# Patient Record
Sex: MALE | Race: OTHER | Employment: UNEMPLOYED | URBAN - METROPOLITAN AREA
[De-identification: names, ages, dates, MRNs, and addresses within clinical notes are randomized per-mention and may not be internally consistent; named-entity substitution may affect disease eponyms.]

---

## 2021-09-26 ENCOUNTER — HOSPITAL ENCOUNTER (EMERGENCY)
Age: 36
Discharge: HOME | End: 2021-09-26

## 2021-09-26 VITALS
DIASTOLIC BLOOD PRESSURE: 96 MMHG | WEIGHT: 160 LBS | HEART RATE: 69 BPM | RESPIRATION RATE: 18 BRPM | TEMPERATURE: 98.5 F | BODY MASS INDEX: 30.21 KG/M2 | HEIGHT: 61 IN | SYSTOLIC BLOOD PRESSURE: 135 MMHG | OXYGEN SATURATION: 98 %

## 2021-09-26 DIAGNOSIS — H92.03 OTALGIA OF BOTH EARS: Primary | ICD-10-CM

## 2021-09-26 DIAGNOSIS — H61.23 BILATERAL IMPACTED CERUMEN: ICD-10-CM

## 2021-09-26 PROCEDURE — 6370000000 HC RX 637 (ALT 250 FOR IP): Performed by: PHYSICIAN ASSISTANT

## 2021-09-26 PROCEDURE — 99283 EMERGENCY DEPT VISIT LOW MDM: CPT

## 2021-09-26 RX ORDER — AMOXICILLIN 250 MG/1
500 CAPSULE ORAL 3 TIMES DAILY
Qty: 42 CAPSULE | Refills: 0 | Status: SHIPPED | OUTPATIENT
Start: 2021-09-26 | End: 2021-09-29 | Stop reason: SDUPTHER

## 2021-09-26 RX ORDER — IBUPROFEN 600 MG/1
600 TABLET ORAL EVERY 6 HOURS PRN
Qty: 20 TABLET | Refills: 0 | Status: SHIPPED | OUTPATIENT
Start: 2021-09-26 | End: 2021-09-29 | Stop reason: SDUPTHER

## 2021-09-26 RX ADMIN — Medication 5 DROP: at 23:30

## 2021-09-26 ASSESSMENT — PAIN SCALES - GENERAL: PAINLEVEL_OUTOF10: 5

## 2021-09-26 ASSESSMENT — PAIN DESCRIPTION - PAIN TYPE: TYPE: ACUTE PAIN

## 2021-09-26 NOTE — ED TRIAGE NOTES
Pt to ED c/o R ear pain x 8 days. Pt states \"I got water in both ears about 8 days ago and now the R ear has a lot of pressure in it. \"

## 2021-09-27 NOTE — ED NOTES
Patient reports to ED with complaints of ear pain after getting water in ears after taking a bath and going under water eight days ago. Patient declines pain with pulling on ears. Patient declines fever, N/V, diarrhea. Upon provider assessment heavy wax noted in both ears, unable to see eardrum due to the amount of wax build up. Patient agreeable to plan of care.          Victorino Ram RN  09/26/21 7371

## 2021-09-27 NOTE — ED PROVIDER NOTES
Triage Chief Complaint:   Otalgia (R x 8 days)    [place]:  Today in the ED I had the pleasure of caring for Lazarus Snuffer who is a 43 y.o. male that presents today to the emergency department complaining of ear pain bilateral.  Ongoing x8 days. Context is patient states he took a bath but he said underwater. Since then he has had pain pressure. Feels like there is still water in his ears. He denies any throat pain head pain headache. No fever chills nausea vomit diarrhea. No trauma to the ears. ROS:  REVIEW OF SYSTEMS    At least 10 systems reviewed      All other review of systems are negative  See HPI and nursing notes for additional information       No past medical history on file. No past surgical history on file. No family history on file. Social History     Socioeconomic History    Marital status: Single     Spouse name: Not on file    Number of children: Not on file    Years of education: Not on file    Highest education level: Not on file   Occupational History    Not on file   Tobacco Use    Smoking status: Not on file   Substance and Sexual Activity    Alcohol use: Not on file    Drug use: Not on file    Sexual activity: Not on file   Other Topics Concern    Not on file   Social History Narrative    Not on file     Social Determinants of Health     Financial Resource Strain:     Difficulty of Paying Living Expenses:    Food Insecurity:     Worried About Running Out of Food in the Last Year:     [address] in the Last Year:    Transportation Needs:     Lack of Transportation (Medical):      Lack of Transportation (Non-Medical):    Physical Activity:     Days of Exercise per Week:     Minutes of Exercise per Session:    Stress:     Feeling of Stress :    Social Connections:     Frequency of Communication with Friends and Family:     Frequency of Social Gatherings with Friends and Family:     Attends [employer] Services:     Active Member of Clubs or Organizations:     Attends Club or Organization Meetings:     Marital Status:    Intimate Partner Violence:     Fear of Current or Ex-Partner:     Emotionally Abused:     Physically Abused:     Sexually Abused:      Current Facility-Administered Medications   Medication Dose Route Frequency Provider Last Rate Last Admin    carbamide peroxide (DEBROX) 6.5 % otic solution 5 drop  5 drop Both Ears Once Katherina Kocher, PA-C         Current Outpatient Medications   Medication Sig Dispense Refill    amoxicillin (AMOXIL) 250 MG capsule Take 2 capsules by mouth 3 times daily for 7 days 42 capsule 0    ibuprofen (IBU) 600 MG tablet Take 1 tablet by mouth every 6 hours as needed for Pain 20 tablet 0     No Known Allergies    Nursing Notes Reviewed    Physical Exam:  ED Triage Vitals   Enc Vitals Group      BP 09/26/21 1942 (!) 135/96      Pulse 09/26/21 1942 69      Resp 09/26/21 1942 18      Temp 09/26/21 1944 98.5 °F (36.9 °C)      Temp Source 09/26/21 1944 Oral      SpO2 09/26/21 1942 98 %      Weight 09/26/21 1942 160 lb (72.6 kg)      Height 09/26/21 1942 5' 1.42\" (1.56 m)      Head Circumference --       Peak Flow --       Pain Score --       Pain Loc --       Pain Edu? --       Excl. in [address]? --      General :Patient is awake alert oriented person place and time no acute distress nontoxic appearing  HEENT: Pupils are equally round and reactive to light extraocular motors are intact conjunctivae clear sclerae white there is no injection no icterus. Nose without any rhinorrhea or epistaxis. Oral mucosa is moist no exudate buccal mucosa shows no ulcerations. Uvula is midline. No tragal tenderness bilateral.  External auditory canals clear from discharge bilateral.  No edema. Cerumen impaction is noted.   Bilateral.  Neck: Neck is supple full range of motion trachea midline thyroid nonpalpable  Cardiac: Heart regular rate rhythm no murmurs rubs clicks or gallops  Lungs: Lungs are clear to auscultation there is no wheezing rhonchi or rales. There is no use of accessory muscles no nasal flaring identified. Chest wall: There is no tenderness to palpation over the chest wall or over ribs  Abdomen: Abdomen is soft nontender nondistended. There is no firm or pulsatile masses no rebound rigidity or guarding negative Pelayo's negative McBurney, no peritoneal signs  Suprapubic:  there is no tenderness to palpation over the external bladder   Musculoskeletal: 5 out of 5 strength in all 4 extremities full flexion extension abduction and adduction supination pronation of all extremities and all digits. No obvious muscle atrophy is noted. No focal muscle deficits are appreciated  Dermatology: Skin is warm and dry there is no obvious abscesses lacerations or lesions noted  Psych: Mentation is grossly normal cognition is grossly normal. Affect is appropriate  Neuro: Motor intact sensory intact cranial nerves II through XII are intact level of consciousness is normal cerebellar function is normal reflexes are grossly normal. No evidence of incontinence or loss of bowel or bladder no saddle anesthesia noted Lymphatic: There is no submandibular or cervical adenopathy appreciated. I have reviewed and interpreted all of the currently available lab results from this visit (if applicable):  No results found for this visit on 09/26/21. Radiographs (if obtained):  [] The following radiograph was interpreted by myself in the absence of a radiologist:   [] Radiologist's Report Reviewed:  No orders to display       EKG (if obtained):   Please See Note of attending physician for EKG interpretation. Chart review shows recent radiograph(s):  No results found.     MDM:     Interventions given this visit:   Orders Placed This Encounter   Medications    carbamide peroxide (DEBROX) 6.5 % otic solution 5 drop    amoxicillin (AMOXIL) 250 MG capsule     Sig: Take 2 capsules by mouth 3 times daily for 7 days     Dispense:  42 capsule     Refill:  0    ibuprofen (IBU) 600 MG tablet     Sig: Take 1 tablet by mouth every 6 hours as needed for Pain     Dispense:  20 tablet     Refill:  0       Pt has ear fullness secondary to cerumen impaction. Will rx debrox drops and abx as I cannot visualize TM as, infection is active differential.     Will refer to ent. I independently managed patient today in the ED    BP (!) 135/96   Pulse 69   Temp 98.5 °F (36.9 °C) (Oral)   Resp 18   Ht 5' 1.42\" (1.56 m)   Wt 160 lb (72.6 kg)   SpO2 98%   BMI 29.82 kg/m²       Clinical Impression:  1. Otalgia of both ears    2. Bilateral impacted cerumen        Disposition referral (if applicable):  Vickie Moise MD  [address]  [phone]    In 2 days      Disposition medications (if applicable):  New Prescriptions    AMOXICILLIN (AMOXIL) 250 MG CAPSULE    Take 2 capsules by mouth 3 times daily for 7 days    IBUPROFEN (IBU) 600 MG TABLET    Take 1 tablet by mouth every 6 hours as needed for Pain         Comment: Please note this report has been produced using speech recognition software and may contain errors related to that system including errors in grammar, punctuation, and spelling, as well as words and phrases that may be inappropriate. If there are any questions or concerns please feel free to contact the dictating provider for clarification.       Wesley Marcial, [address], [place]  09/26/21 4468

## 2021-09-29 RX ORDER — IBUPROFEN 600 MG/1
600 TABLET ORAL EVERY 6 HOURS PRN
Qty: 20 TABLET | Refills: 0 | Status: SHIPPED | OUTPATIENT
Start: 2021-09-29 | End: 2021-10-29

## 2021-09-29 RX ORDER — AMOXICILLIN 250 MG/1
500 CAPSULE ORAL 3 TIMES DAILY
Qty: 42 CAPSULE | Refills: 0 | Status: SHIPPED | OUTPATIENT
Start: 2021-09-29 | End: 2021-10-06

## 2021-09-29 NOTE — ED PROVIDER NOTES
9/29/2021 1915: Patient came to the ED requesting prescriptions for amoxicillin and ibuprofen that were written during last ED visit, states he did not receive them when he was discharged. I did reprint and sign and provided printed scripts and discussed with patient that he can go to any pharmacy to fill them. Patient also states that he was referred to ENT but is unable to make an appointment as he is Greek speaking only.  I did consult with case management [address] Goodman who spoke with patient and will assist with patient a followup appointment     Alyssa Dallas PA-C  09/29/21 1918

## 2021-09-29 NOTE — CARE COORDINATION
LSW received a consult from [facility]. Lavonia Dakin PA-C reported that patient was seen here in ED but needs to be get appointment at the following:   Dr. Viola Weller  Otolaryngology  [address] [address], [address] [place] [address]  [phone]    Patient speaks Romansh and needs assistance with making an appointment b/c there is no one in his residence that speaks English and can set up appointment for patient. LSW met with patient and used [facility], [facility] #[id number]. Through the assistance of  service. .. LSW was able to arrange with patient for patient to return to the emergency room and meet with this  on Friday 11 a.m. through 1 p.m. This LSW will arrange Ear, Nose and Throat appointment for patient. Patient is only able to meet on Fridays and go to appointment on Friday due to patient needing transportation from a friend that is available Friday to drive patient. Patient understands that patient will come back to emergency room at 11 a.m. - 1 p.m. and get his appointment time and date from this LSW. LSW will call Dr. Viola Weller tomorrow during business hours and ensure that [facility] has an interpretation service. If they have service; this LSW will make appointment for patient. Patient will then come to ED on Friday and LSW will get interpretation service assistance to explain to patient when his appointment will be. Patient understood and did not have any further questions. LSW to see patient 10/1/2021--Friday. Patient in agreement.